# Patient Record
(demographics unavailable — no encounter records)

---

## 2025-05-13 NOTE — HISTORY OF PRESENT ILLNESS
[TextBox_4] : Jazmyne is a 32 y/o G0 who presents today with c/o incidental finding of enlarged right ovary (as compared to left) on a CT of the pelvis performed at [facility] on 4/18/25. She states she was referred for this imaging 2nd to c/o chronic tailbone pain (x3 years). She denies a history of irregular menses (except for Feb & March 2025 which she states was due to excessive stress), significant acne or facial hair.  She denies pain with sex or significant pain with menses. She denies any abnormal paps. She denies any pelvic pain today.  CT scan notes enlarged right ovary with numerous follices. radiologist advises dedicated pelvic u/s.  She is accompanied by her mother day./ Pt presented without copies of CT from [facility].  contacted R and had imaging faxed to office for review. [FreeTextEntry1] : 4/27/25

## 2025-05-13 NOTE — DISCUSSION/SUMMARY
[FreeTextEntry1] : 1) pt history reviewed 2) Pelvic CT reviewed - scanned to chart 3) rx for TV sono issued  will f/u pending receipt of TV sono

## 2025-05-13 NOTE — PHYSICAL EXAM
[Appropriately responsive] : appropriately responsive [Alert] : alert [No Acute Distress] : no acute distress [FreeTextEntry7] : slight tenderness upon palpation to RLQ